# Patient Record
Sex: FEMALE | Race: WHITE | NOT HISPANIC OR LATINO | Employment: OTHER | URBAN - METROPOLITAN AREA
[De-identification: names, ages, dates, MRNs, and addresses within clinical notes are randomized per-mention and may not be internally consistent; named-entity substitution may affect disease eponyms.]

---

## 2017-10-31 ENCOUNTER — HOSPITAL ENCOUNTER (OUTPATIENT)
Dept: RADIOLOGY | Facility: HOSPITAL | Age: 62
Discharge: HOME | End: 2017-10-31
Attending: INTERNAL MEDICINE
Payer: MEDICARE

## 2017-10-31 ENCOUNTER — OFFICE VISIT (OUTPATIENT)
Dept: RHEUMATOLOGY | Facility: CLINIC | Age: 62
End: 2017-10-31
Payer: MEDICARE

## 2017-10-31 VITALS
DIASTOLIC BLOOD PRESSURE: 90 MMHG | HEART RATE: 80 BPM | SYSTOLIC BLOOD PRESSURE: 130 MMHG | HEIGHT: 60 IN | WEIGHT: 132.5 LBS | BODY MASS INDEX: 26.01 KG/M2

## 2017-10-31 DIAGNOSIS — M13.0 POLYARTHRITIS: ICD-10-CM

## 2017-10-31 DIAGNOSIS — R76.8 ANA POSITIVE: ICD-10-CM

## 2017-10-31 DIAGNOSIS — R76.8 ANA POSITIVE: Primary | ICD-10-CM

## 2017-10-31 DIAGNOSIS — I73.00 RAYNAUD'S DISEASE WITHOUT GANGRENE: ICD-10-CM

## 2017-10-31 PROCEDURE — 99205 OFFICE O/P NEW HI 60 MIN: CPT | Mod: S$GLB,,, | Performed by: INTERNAL MEDICINE

## 2017-10-31 PROCEDURE — 99999 PR PBB SHADOW E&M-NEW PATIENT-LVL III: CPT | Mod: PBBFAC,,, | Performed by: INTERNAL MEDICINE

## 2017-10-31 PROCEDURE — 73130 X-RAY EXAM OF HAND: CPT | Mod: 26,RT,, | Performed by: RADIOLOGY

## 2017-10-31 PROCEDURE — 73130 X-RAY EXAM OF HAND: CPT | Mod: 50,TC,PO

## 2017-10-31 PROCEDURE — 73130 X-RAY EXAM OF HAND: CPT | Mod: 26,59,LT, | Performed by: RADIOLOGY

## 2017-10-31 RX ORDER — METFORMIN HYDROCHLORIDE 500 MG/1
500 TABLET ORAL
COMMUNITY

## 2017-10-31 RX ORDER — ALBUTEROL SULFATE 90 UG/1
2 AEROSOL, METERED RESPIRATORY (INHALATION) EVERY 6 HOURS PRN
COMMUNITY

## 2017-10-31 RX ORDER — NABUMETONE 500 MG/1
500 TABLET, FILM COATED ORAL 2 TIMES DAILY
Qty: 60 TABLET | Refills: 11 | Status: SHIPPED | OUTPATIENT
Start: 2017-10-31 | End: 2018-03-13 | Stop reason: SDUPTHER

## 2017-10-31 RX ORDER — GABAPENTIN 300 MG/1
300 CAPSULE ORAL 2 TIMES DAILY
COMMUNITY

## 2017-10-31 RX ORDER — MELOXICAM 15 MG/1
15 TABLET ORAL DAILY
COMMUNITY
End: 2017-10-31

## 2017-10-31 RX ORDER — NIFEDIPINE 30 MG/1
30 TABLET, FILM COATED, EXTENDED RELEASE ORAL DAILY
Qty: 30 TABLET | Refills: 11 | Status: SHIPPED | OUTPATIENT
Start: 2017-10-31 | End: 2018-03-13 | Stop reason: SDUPTHER

## 2017-10-31 RX ORDER — FLUTICASONE PROPIONATE AND SALMETEROL 100; 50 UG/1; UG/1
1 POWDER RESPIRATORY (INHALATION) 2 TIMES DAILY
COMMUNITY
End: 2021-05-05

## 2017-10-31 RX ORDER — ROSUVASTATIN CALCIUM 20 MG/1
20 TABLET, COATED ORAL DAILY
COMMUNITY
End: 2021-05-05

## 2017-10-31 RX ORDER — NORTRIPTYLINE HYDROCHLORIDE 25 MG/1
25 CAPSULE ORAL NIGHTLY
COMMUNITY

## 2017-10-31 RX ORDER — OMEPRAZOLE 20 MG/1
20 CAPSULE, DELAYED RELEASE ORAL DAILY
COMMUNITY

## 2017-10-31 NOTE — PROGRESS NOTES
Subjective:          Chief Complaint: Minal Hay is a 62 y.o. female who had concerns including Disease Management.    HPI:    She is a 62-year-old female she's here for history of possible RA with waxing serologies, ++Raynauds triphasic hands and feet. She has seen [facility] rheumatology in the past told she may have RA  Pain in fingers, right elbow, bursitis in hips and shoulders intermittently.   Recent +DARYL 7/2017: 1:80 centromere pattern, RF negative, hepatitis panel.   She has stiffness in joints worse with use. Intermittent swelling throughout the day. She notes some stiffness in the morning.   No Ps, no famhx ps, no IBD. DDD in the cervical spine using gabapentin for this. No FRANKIE/RFA but has not seen in about 1 year.   No anemia, with exception of menstruation. No hx seizure, no stroke. She does have COPD no ILD. Pleurisy only with pneumonia. She has residual scar on right lung. No sclerodactyly  She denies sclerodactyly, no dysphagia. + peripheral neuropathy attributed to the Raynauds-never NCS/EMG-describes pin/needles in feet constantly. Feel like walking on rough concrete when walking.   She uses Mobic 15mg daily some help.     Breast cancer x 10 years. Lumpectomy and radiation. UTD mammogram.       REVIEW OF SYSTEMS:    Review of Systems   Constitutional: Negative for fever, malaise/fatigue and weight loss.   HENT: Negative for sore throat.    Eyes: Negative for double vision, photophobia and redness.   Respiratory: Negative for cough, shortness of breath and wheezing.    Cardiovascular: Negative for chest pain, palpitations and orthopnea.   Gastrointestinal: Negative for abdominal pain, constipation and diarrhea.   Genitourinary: Negative for dysuria, hematuria and urgency.   Musculoskeletal: Positive for joint pain. Negative for back pain and myalgias.   Skin: Negative for rash.   Neurological: Negative for dizziness, tingling, focal weakness and headaches.   Endo/Heme/Allergies: Does not bruise/bleed  easily.   Psychiatric/Behavioral: Negative for depression, hallucinations and suicidal ideas.               Objective:            No past medical history on file.  No family history on file.  Social History   Substance Use Topics    Smoking status: Former Smoker     Packs/day: 1.50     Years: 42.00     Types: Cigarettes     Quit date: 2013    Smokeless tobacco: Never Used    Alcohol use No         No current outpatient prescriptions on file prior to visit.     No current facility-administered medications on file prior to visit.        Vitals:    10/31/17 1100   BP: (!) 130/90   Pulse: 80       Physical Exam:    Physical Exam   Constitutional: She appears well-developed and well-nourished.   HENT:   Nose: No septal deviation.   Mouth/Throat: Mucous membranes are normal. No oral lesions.   Eyes: Pupils are equal, round, and reactive to light. Right conjunctiva is not injected. Left conjunctiva is not injected.   Neck: No JVD present. No thyroid mass and no thyromegaly present.   Cardiovascular: Normal rate, regular rhythm and normal pulses.    No edema   Pulmonary/Chest: Effort normal and breath sounds normal.   Abdominal: Soft. Normal appearance. There is no hepatosplenomegaly.   Musculoskeletal:        Right shoulder: She exhibits tenderness. She exhibits normal range of motion and no swelling.        Left shoulder: She exhibits tenderness. She exhibits normal range of motion and no swelling.        Right elbow: She exhibits normal range of motion and no swelling. No tenderness found.        Left elbow: She exhibits normal range of motion and no swelling. No tenderness found.        Right wrist: She exhibits normal range of motion, no tenderness and no swelling.        Left wrist: She exhibits normal range of motion, no tenderness and no swelling.        Right hip: She exhibits bony tenderness. She exhibits normal range of motion, normal strength and no swelling.        Left hip: She exhibits bony tenderness. She  exhibits normal range of motion, no tenderness and no swelling.        Right knee: She exhibits normal range of motion and no swelling. No tenderness found.        Left knee: She exhibits normal range of motion and no swelling. No tenderness found.        Right ankle: She exhibits normal range of motion and no swelling. No tenderness.        Left ankle: She exhibits normal range of motion and no swelling. No tenderness.        Right hand: She exhibits tenderness.        Left hand: She exhibits tenderness.   5/5 upper and lower extremity bilateral muscle strength   Lymphadenopathy:     She has no cervical adenopathy.     She has no axillary adenopathy.   Neurological: She has normal strength and normal reflexes.   Skin: Skin is dry and intact.   +Raynauds   Psychiatric: She has a normal mood and affect.             Assessment:       Encounter Diagnoses   Name Primary?    DARYL positive Yes    Raynaud's disease without gangrene     Polyarthritis           Plan:        DARYL positive  Comments:  UCTD vs incidental +DARYL   Orders:  -     Rheumatoid factor; Future; Expected date: 10/31/2017  -     Sedimentation rate, manual; Future; Expected date: 10/31/2017  -     Sjogrens syndrome-A extractable nuclear antibody; Future; Expected date: 10/31/2017  -     Anti Sm/RNP Antibody; Future; Expected date: 10/31/2017  -     Anti-DNA antibody, double-stranded; Future; Expected date: 10/31/2017  -     Anti-Smith antibody; Future; Expected date: 10/31/2017  -     C3 complement; Future; Expected date: 10/31/2017  -     C4 complement; Future; Expected date: 10/31/2017  -     Complement, total; Future; Expected date: 10/31/2017  -     Sjogrens syndrome-B extractable nuclear antibody; Future; Expected date: 10/31/2017  -     Rheumatoid factor; Future; Expected date: 10/31/2017  -     Cyclic citrul peptide antibody, IgG; Future; Expected date: 10/31/2017  -     NIFEdipine (ADALAT CC) 30 MG TbSR; Take 1 tablet (30 mg total) by mouth once  daily.  Dispense: 30 tablet; Refill: 11  -     X-Ray Hand 3 View Bilateral; Future; Expected date: 10/31/2017    Raynaud's disease without gangrene  -     nabumetone (RELAFEN) 500 MG tablet; Take 1 tablet (500 mg total) by mouth 2 (two) times daily.  Dispense: 60 tablet; Refill: 11    Polyarthritis  -     X-Ray Hand 3 View Bilateral; Future; Expected date: 10/31/2017      Pt with +DARYL and raynauds : arthritis more OA in pattern but for now consider UCTD.    -start nifedipine  Regarding arthritis   -change meloxicam to nabumetone see if this helps   -baseline radiographs.   No indication of major organ dysfunction. The centromere pattern would make one think CREST but no features other than Raynaud's  Some sicca complaints: check SSA /SSB.       Return in about 4 months (around 2/28/2018).        Thank you for allowing me to participate in the care of this very pleasant patient.    60min consultation with greater than 50% spent in counseling, chart review and coordination of care. All questions answered.

## 2017-10-31 NOTE — LETTER
November 5, 2017      Anthony D. Haase III, MD  [address] [place] [address]           [facility] [address] [facility] [address]  [place] [address]  Phone: [phone]  Fax: [phone]          Patient: Minal Hay   MR Number: [id number]   YOB: 1955   Date of Visit: 10/31/2017       Dear Dr. Anthony D. Haase III:    Thank you for referring Minal Hay to me for evaluation. Attached you will find relevant portions of my assessment and plan of care.    If you have questions, please do not hesitate to call me. I look forward to following Minal Hay along with you.    Sincerely,    Karina Mcghee, DO    Enclosure  CC:  No Recipients    If you would like to receive this communication electronically, please contact [email or web address] or [phone] to request more information on [facility] access.    For providers and/or their staff who would like to refer a patient to [facility], please contact us through our one-stop-shop provider referral line, [facility], at [phone].    If you feel you have received this communication in error or would no longer like to receive these types of communications, please e-mail [email or web address]

## 2018-03-13 ENCOUNTER — OFFICE VISIT (OUTPATIENT)
Dept: RHEUMATOLOGY | Facility: CLINIC | Age: 63
End: 2018-03-13
Payer: MEDICARE

## 2018-03-13 VITALS
HEIGHT: 60 IN | WEIGHT: 132.38 LBS | DIASTOLIC BLOOD PRESSURE: 70 MMHG | BODY MASS INDEX: 25.99 KG/M2 | HEART RATE: 64 BPM | SYSTOLIC BLOOD PRESSURE: 100 MMHG

## 2018-03-13 DIAGNOSIS — M15.9 PRIMARY OSTEOARTHRITIS INVOLVING MULTIPLE JOINTS: Primary | ICD-10-CM

## 2018-03-13 DIAGNOSIS — M50.30 DDD (DEGENERATIVE DISC DISEASE), CERVICAL: ICD-10-CM

## 2018-03-13 DIAGNOSIS — M75.52 ACUTE BURSITIS OF LEFT SHOULDER: ICD-10-CM

## 2018-03-13 DIAGNOSIS — M77.11 LATERAL EPICONDYLITIS OF RIGHT ELBOW: ICD-10-CM

## 2018-03-13 DIAGNOSIS — I73.00 RAYNAUD'S DISEASE WITHOUT GANGRENE: ICD-10-CM

## 2018-03-13 DIAGNOSIS — R76.8 ANA POSITIVE: ICD-10-CM

## 2018-03-13 DIAGNOSIS — M70.62 GREATER TROCHANTERIC BURSITIS OF LEFT HIP: ICD-10-CM

## 2018-03-13 PROCEDURE — 20610 DRAIN/INJ JOINT/BURSA W/O US: CPT | Mod: LT,S$GLB,, | Performed by: INTERNAL MEDICINE

## 2018-03-13 PROCEDURE — 99999 PR PBB SHADOW E&M-EST. PATIENT-LVL III: CPT | Mod: PBBFAC,,, | Performed by: INTERNAL MEDICINE

## 2018-03-13 PROCEDURE — 99214 OFFICE O/P EST MOD 30 MIN: CPT | Mod: 25,S$GLB,, | Performed by: INTERNAL MEDICINE

## 2018-03-13 RX ORDER — NIFEDIPINE 30 MG/1
30 TABLET, FILM COATED, EXTENDED RELEASE ORAL DAILY
Qty: 30 TABLET | Refills: 11 | Status: SHIPPED | OUTPATIENT
Start: 2018-03-13 | End: 2018-11-28 | Stop reason: SDUPTHER

## 2018-03-13 RX ORDER — NABUMETONE 500 MG/1
500 TABLET, FILM COATED ORAL 2 TIMES DAILY
Qty: 60 TABLET | Refills: 11 | Status: SHIPPED | OUTPATIENT
Start: 2018-03-13 | End: 2018-07-24

## 2018-03-13 RX ADMIN — METHYLPREDNISOLONE ACETATE 40 MG: 40 INJECTION, SUSPENSION INTRA-ARTICULAR; INTRALESIONAL; INTRAMUSCULAR; SOFT TISSUE at 05:03

## 2018-03-13 ASSESSMENT — ROUTINE ASSESSMENT OF PATIENT INDEX DATA (RAPID3)
PSYCHOLOGICAL DISTRESS SCORE: 1.1
PAIN SCORE: 6
TOTAL RAPID3 SCORE: 3.17
MDHAQ FUNCTION SCORE: .3
PATIENT GLOBAL ASSESSMENT SCORE: 2.5

## 2018-03-13 NOTE — PROCEDURES
Large Joint Aspiration/Injection  Date/Time: 3/13/2018 5:01 PM  Performed by: LAURA KIMBROUGH  Authorized by: LAURA KIMBROUGH     Consent Done?:  Yes (Verbal)  Indications:  Pain  Procedure site marked: Yes    Timeout: Prior to procedure the correct patient, procedure, and site was verified      Location:  Hip  Site:  L greater trochanteric bursa  Ultrasonic Guidance for needle placement: No  Needle size:  25 G  Approach:  Lateral  Medications:  40 mg methylPREDNISolone acetate 40 mg/mL  Patient tolerance:  Patient tolerated the procedure well with no immediate complications    Additional Comments: Patient informed of the risks, benefits, side effects of corticosteroid injections including but not limited to skin hypopigmentation, atrophy, ineffectiveness and infection.

## 2018-03-13 NOTE — PROGRESS NOTES
Subjective:          Chief Complaint: Minal Hay is a 62 y.o. female who had concerns including Disease Management.    HPI:  Interval events:    started nifedipine for Raynaud's working very well given winter we had is great.   serologies as below negative  She's having   right elbow with tendinitis, worse in AM after sleeping on it.    left shoulder pain with some cervical spasm she did well with PT as well as FRANKIE/RFA.    left hip pain with bursitis -has pain with sitting, crossing legs, lying on left at night       fingers or aching we switched her from previous nonsteroidal to Relafen. She is doing very well.       She is a 62-year-old female she's here for history of possible RA with waxing serologies, ++Raynauds triphasic hands and feet. She has seen [facility] rheumatology in the past told she may have RA  Pain in fingers, right elbow, bursitis in hips and shoulders intermittently.   Recent +DARYL 7/2017: 1:80 centromere pattern, RF negative, hepatitis panel.   She has stiffness in joints worse with use. Intermittent swelling throughout the day. She notes some stiffness in the morning.   No Ps, no famhx ps, no IBD. DDD in the cervical spine using gabapentin for this. No FRANKIE/RFA but has not seen in about 1 year.   No anemia, with exception of menstruation. No hx seizure, no stroke. She does have COPD no ILD. Pleurisy only with pneumonia. She has residual scar on right lung. No sclerodactyly  She denies sclerodactyly, no dysphagia. + peripheral neuropathy attributed to the Raynauds-never NCS/EMG-describes pin/needles in feet constantly. Feel like walking on rough concrete when walking.   She uses Mobic 15mg daily some help.     Breast cancer x 10 years. Lumpectomy and radiation. UTD mammogram.   Component      Latest Ref Rng & Units 10/31/2017 10/31/2017          12:21 PM 12:21 PM   Anti-SSA Antibody      0.00 - 19.99 EU  2.46   Anti-SSA Interpretation      Negative  Negative   Anti Sm/RNP Antibody      0.00 -  19.99 EU  1.63   Anti-Sm/RNP Interpretation      Negative  Negative   Anti Sm Antibody      0.00 - 19.99 EU  2.01   Anti-Sm Interpretation      Negative  Negative   Anti-SSB Antibody      0.00 - 19.99 EU  0.28   Anti-SSB Interpretation      Negative  Negative   Rheumatoid Factor      0.0 - 15.0 IU/mL <10.0 <10.0   Sed Rate      0 - 20 mm/Hr  18   ds DNA Ab      Negative 1:10  Negative 1:10   Complement (C-3)      50 - 180 mg/dL  139   Complement (C-4)      11 - 44 mg/dL  25   Complement,Total, Serum      42 - 95 U/mL  82   CCP Antibodies      <5.0 U/mL  <0.5       REVIEW OF SYSTEMS:    Review of Systems   Constitutional: Negative for fever, malaise/fatigue and weight loss.   HENT: Negative for sore throat.    Eyes: Negative for double vision, photophobia and redness.   Respiratory: Negative for cough, shortness of breath and wheezing.    Cardiovascular: Negative for chest pain, palpitations and orthopnea.   Gastrointestinal: Negative for abdominal pain, constipation and diarrhea.   Genitourinary: Negative for dysuria, hematuria and urgency.   Musculoskeletal: Positive for joint pain. Negative for back pain and myalgias.   Skin: Negative for rash.   Neurological: Negative for dizziness, tingling, focal weakness and headaches.   Endo/Heme/Allergies: Does not bruise/bleed easily.   Psychiatric/Behavioral: Negative for depression, hallucinations and suicidal ideas.               Objective:            Past Medical History:   Diagnosis Date    Arthritis     Cancer     COPD (chronic obstructive pulmonary disease)     Dry eyes     GERD (gastroesophageal reflux disease)     Hyperlipidemia      Family History   Problem Relation Age of Onset    Alzheimer's disease Mother     Hypertension Mother     Cancer Father     Hypertension Sister     Kidney disease Sister     Heart disease Sister     Hypertension Brother     No Known Problems Daughter     Guillain-Tiger syndrome Maternal Aunt     Cancer Paternal  Grandmother     Cancer Paternal Grandfather     COPD Paternal Grandfather     Fibromyalgia Sister     Diabetes Mellitus Sister     No Known Problems Sister     No Known Problems Sister     Diabetes Mellitus Brother     Hypertension Brother     Hyperlipidemia Brother     Diabetes Mellitus Brother     Hypertension Brother     Osteoarthritis Brother      Social History   Substance Use Topics    Smoking status: Former Smoker     Packs/day: 1.50     Years: 42.00     Types: Cigarettes     Quit date: 2013    Smokeless tobacco: Never Used    Alcohol use No         Current Outpatient Prescriptions on File Prior to Visit   Medication Sig Dispense Refill    albuterol 90 mcg/actuation inhaler Inhale 2 puffs into the lungs every 6 (six) hours as needed for Wheezing. Rescue      fluticasone-salmeterol 100-50 mcg/dose (ADVAIR DISKUS) 100-50 mcg/dose diskus inhaler Inhale 1 puff into the lungs 2 (two) times daily. Controller      gabapentin (NEURONTIN) 300 MG capsule Take 300 mg by mouth 2 (two) times daily.      metFORMIN (GLUCOPHAGE) 500 MG tablet Take 500 mg by mouth daily with breakfast.      nabumetone (RELAFEN) 500 MG tablet Take 1 tablet (500 mg total) by mouth 2 (two) times daily. 60 tablet 11    NIFEdipine (ADALAT CC) 30 MG TbSR Take 1 tablet (30 mg total) by mouth once daily. 30 tablet 11    nortriptyline (PAMELOR) 25 MG capsule Take 25 mg by mouth every evening.      omeprazole (PRILOSEC) 20 MG capsule Take 20 mg by mouth once daily.      rosuvastatin (CRESTOR) 20 MG tablet Take 20 mg by mouth once daily.       No current facility-administered medications on file prior to visit.        Vitals:    03/13/18 1614   BP: 100/70   Pulse: 64       Physical Exam:    Physical Exam   Constitutional: She appears well-developed and well-nourished.   HENT:   Nose: No septal deviation.   Mouth/Throat: Mucous membranes are normal. No oral lesions.   Eyes: Pupils are equal, round, and reactive to light. Right  conjunctiva is not injected. Left conjunctiva is not injected.   Neck: No JVD present. No thyroid mass and no thyromegaly present.   Cardiovascular: Normal rate, regular rhythm and normal pulses.    No edema   Pulmonary/Chest: Effort normal and breath sounds normal.   Abdominal: Soft. Normal appearance. There is no hepatosplenomegaly.   Musculoskeletal:        Right shoulder: She exhibits tenderness. She exhibits normal range of motion and no swelling.        Left shoulder: She exhibits tenderness. She exhibits normal range of motion and no swelling.        Right elbow: She exhibits normal range of motion and no swelling. No tenderness found.        Left elbow: She exhibits normal range of motion and no swelling. No tenderness found.        Right wrist: She exhibits normal range of motion, no tenderness and no swelling.        Left wrist: She exhibits normal range of motion, no tenderness and no swelling.        Right hip: She exhibits bony tenderness. She exhibits normal range of motion, normal strength and no swelling.        Left hip: She exhibits bony tenderness. She exhibits normal range of motion, no tenderness and no swelling.        Right knee: She exhibits normal range of motion and no swelling. No tenderness found.        Left knee: She exhibits normal range of motion and no swelling. No tenderness found.        Right ankle: She exhibits normal range of motion and no swelling. No tenderness.        Left ankle: She exhibits normal range of motion and no swelling. No tenderness.        Right hand: She exhibits tenderness.        Left hand: She exhibits tenderness.   5/5 upper and lower extremity bilateral muscle strength   Lymphadenopathy:     She has no cervical adenopathy.     She has no axillary adenopathy.   Neurological: She has normal strength and normal reflexes.   Skin: Skin is dry and intact.   +Raynauds   Psychiatric: She has a normal mood and affect.             Assessment:       Encounter  Diagnoses   Name Primary?    Primary osteoarthritis involving multiple joints Yes    Lateral epicondylitis of right elbow     Greater trochanteric bursitis of left hip     Acute bursitis of left shoulder     DDD (degenerative disc disease), cervical     DARYL positive     Raynaud's disease without gangrene           Plan:        Primary osteoarthritis involving multiple joints  -     nabumetone (RELAFEN) 500 MG tablet; Take 1 tablet (500 mg total) by mouth 2 (two) times daily.  Dispense: 60 tablet; Refill: 11    Lateral epicondylitis of right elbow  Comments:  exercises given  Orders:  -     nabumetone (RELAFEN) 500 MG tablet; Take 1 tablet (500 mg total) by mouth 2 (two) times daily.  Dispense: 60 tablet; Refill: 11    Greater trochanteric bursitis of left hip  Comments:  inject today  Orders:  -     Large Joint Aspiration/Injection    Acute bursitis of left shoulder  Comments:  Brian    DDD (degenerative disc disease), cervical  [facility]    DARYL positive  Comments:  UCTD vs incidental +DARYL     Raynaud's disease without gangrene  -     NIFEdipine (ADALAT CC) 30 MG TbSR; Take 1 tablet (30 mg total) by mouth once daily.  Dispense: 30 tablet; Refill: 11  -     nabumetone (RELAFEN) 500 MG tablet; Take 1 tablet (500 mg total) by mouth 2 (two) times daily.  Dispense: 60 tablet; Refill: 11      Pt with +DARYL and raynauds : arthritis more OA in pattern    -continue nifedipine  Regarding arthritis   -Nabumetone doing well  Injection greater trochanter LEFT    No indication of major organ dysfunction. The centromere pattern would make one think CREST but no features other than Raynaud's    Follow-up in about 5 months (around 8/13/2018).        30min consultation with greater than 50% spent in counseling, chart review and coordination of care. All questions answered.

## 2018-03-25 RX ORDER — METHYLPREDNISOLONE ACETATE 40 MG/ML
40 INJECTION, SUSPENSION INTRA-ARTICULAR; INTRALESIONAL; INTRAMUSCULAR; SOFT TISSUE
Status: DISCONTINUED | OUTPATIENT
Start: 2018-03-13 | End: 2018-03-25 | Stop reason: HOSPADM

## 2018-07-24 ENCOUNTER — OFFICE VISIT (OUTPATIENT)
Dept: RHEUMATOLOGY | Facility: CLINIC | Age: 63
End: 2018-07-24
Payer: MEDICARE

## 2018-07-24 VITALS
SYSTOLIC BLOOD PRESSURE: 100 MMHG | HEIGHT: 60 IN | BODY MASS INDEX: 25.67 KG/M2 | DIASTOLIC BLOOD PRESSURE: 70 MMHG | WEIGHT: 130.75 LBS | HEART RATE: 64 BPM

## 2018-07-24 DIAGNOSIS — M15.9 PRIMARY OSTEOARTHRITIS INVOLVING MULTIPLE JOINTS: ICD-10-CM

## 2018-07-24 DIAGNOSIS — M75.52 ACUTE SHOULDER BURSITIS, LEFT: Primary | ICD-10-CM

## 2018-07-24 DIAGNOSIS — M77.01 GOLFER'S ELBOW, RIGHT: ICD-10-CM

## 2018-07-24 DIAGNOSIS — G56.21 ENTRAPMENT OF RIGHT ULNAR NERVE: ICD-10-CM

## 2018-07-24 PROCEDURE — 99214 OFFICE O/P EST MOD 30 MIN: CPT | Mod: 25,S$GLB,, | Performed by: INTERNAL MEDICINE

## 2018-07-24 PROCEDURE — 3008F BODY MASS INDEX DOCD: CPT | Mod: CPTII,S$GLB,, | Performed by: INTERNAL MEDICINE

## 2018-07-24 PROCEDURE — 99999 PR PBB SHADOW E&M-EST. PATIENT-LVL III: CPT | Mod: PBBFAC,,, | Performed by: INTERNAL MEDICINE

## 2018-07-24 RX ORDER — DICLOFENAC SODIUM 50 MG/1
50 TABLET, DELAYED RELEASE ORAL 2 TIMES DAILY
Qty: 60 TABLET | Refills: 4 | Status: SHIPPED | OUTPATIENT
Start: 2018-07-24 | End: 2018-08-23

## 2018-07-24 ASSESSMENT — ROUTINE ASSESSMENT OF PATIENT INDEX DATA (RAPID3)
MDHAQ FUNCTION SCORE: .3
PAIN SCORE: 10
PATIENT GLOBAL ASSESSMENT SCORE: 4
TOTAL RAPID3 SCORE: 5
PSYCHOLOGICAL DISTRESS SCORE: 0

## 2018-07-24 NOTE — PROGRESS NOTES
Subjective:          Chief Complaint: Minal Hay is a 62 y.o. female who had concerns including Disease Management.    HPI:  Interval events:   nifedipine for Raynaud's working very well    serologies as below negative  Injected gr. Troch last visit  Continued Nabumetone.      right elbow with tendinitis, progressively worsening. She notes exacerbated with typing on computer. Not much pain in the right shoulder. Elbow increased in intensity last 30 days. Worse with elbow flexed. Some numbness. Waking with hand asleep.    left shoulder pain with some cervical spasm she did well with PT as well as FRANKIE/RFA. Left shoulder increasingly painful with driving. She has pain with putting on shirts. On the left.    left hip pain with bursitis doing well with this since last visit.   She states there are days where she just hurts from neck to toes.   Hands still painful.     RHeum Hx:     She is a 62-year-old female she's here for history of possible RA with waxing serologies, ++Raynauds triphasic hands and feet. She has seen [facility] rheumatology in the past told she may have RA  Pain in fingers, right elbow, bursitis in hips and shoulders intermittently.   Recent +DARYL 7/2017: 1:80 centromere pattern, RF negative, hepatitis panel.   She has stiffness in joints worse with use. Intermittent swelling throughout the day. She notes some stiffness in the morning.   No Ps, no famhx ps, no IBD. DDD in the cervical spine using gabapentin for this. No FRANKIE/RFA but has not seen in about 1 year.   No anemia, with exception of menstruation. No hx seizure, no stroke. She does have COPD no ILD. Pleurisy only with pneumonia. She has residual scar on right lung. No sclerodactyly  She denies sclerodactyly, no dysphagia. + peripheral neuropathy attributed to the Raynauds-never NCS/EMG-describes pin/needles in feet constantly. Feel like walking on rough concrete when walking.   She uses Mobic 15mg daily some help.     Breast cancer x 10 years.  Lumpectomy and radiation. UTD mammogram.   Component      Latest Ref Rng & Units 10/31/2017 10/31/2017          12:21 PM 12:21 PM   Anti-SSA Antibody      0.00 - 19.99 EU  2.46   Anti-SSA Interpretation      Negative  Negative   Anti Sm/RNP Antibody      0.00 - 19.99 EU  1.63   Anti-Sm/RNP Interpretation      Negative  Negative   Anti Sm Antibody      0.00 - 19.99 EU  2.01   Anti-Sm Interpretation      Negative  Negative   Anti-SSB Antibody      0.00 - 19.99 EU  0.28   Anti-SSB Interpretation      Negative  Negative   Rheumatoid Factor      0.0 - 15.0 IU/mL <10.0 <10.0   Sed Rate      0 - 20 mm/Hr  18   ds DNA Ab      Negative 1:10  Negative 1:10   Complement (C-3)      50 - 180 mg/dL  139   Complement (C-4)      11 - 44 mg/dL  25   Complement,Total, Serum      42 - 95 U/mL  82   CCP Antibodies      <5.0 U/mL  <0.5       REVIEW OF SYSTEMS:    Review of Systems   Constitutional: Negative for fever, malaise/fatigue and weight loss.   HENT: Negative for sore throat.    Eyes: Negative for double vision, photophobia and redness.   Respiratory: Negative for cough, shortness of breath and wheezing.    Cardiovascular: Negative for chest pain, palpitations and orthopnea.   Gastrointestinal: Negative for abdominal pain, constipation and diarrhea.   Genitourinary: Negative for dysuria, hematuria and urgency.   Musculoskeletal: Positive for joint pain. Negative for back pain and myalgias.   Skin: Negative for rash.   Neurological: Negative for dizziness, tingling, focal weakness and headaches.   Endo/Heme/Allergies: Does not bruise/bleed easily.   Psychiatric/Behavioral: Negative for depression, hallucinations and suicidal ideas.               Objective:            Past Medical History:   Diagnosis Date    Arthritis     Cancer     COPD (chronic obstructive pulmonary disease)     Dry eyes     GERD (gastroesophageal reflux disease)     Hyperlipidemia      Family History   Problem Relation Age of Onset    Alzheimer's  disease Mother     Hypertension Mother     Cancer Father     Hypertension Sister     Kidney disease Sister     Heart disease Sister     Hypertension Brother     No Known Problems Daughter     Guillain-[place] syndrome Maternal Aunt     Cancer Paternal Grandmother     Cancer Paternal Grandfather     COPD Paternal Grandfather     Fibromyalgia Sister     Diabetes Mellitus Sister     No Known Problems Sister     No Known Problems Sister     Diabetes Mellitus Brother     Hypertension Brother     Hyperlipidemia Brother     Diabetes Mellitus Brother     Hypertension Brother     Osteoarthritis Brother      Social History   Substance Use Topics    Smoking status: Former Smoker     Packs/day: 1.50     Years: 42.00     Types: Cigarettes     Quit date: 2013    Smokeless tobacco: Never Used    Alcohol use No         Current Outpatient Prescriptions on File Prior to Visit   Medication Sig Dispense Refill    albuterol 90 mcg/actuation inhaler Inhale 2 puffs into the lungs every 6 (six) hours as needed for Wheezing. Rescue      fluticasone-salmeterol 100-50 mcg/dose (ADVAIR DISKUS) 100-50 mcg/dose diskus inhaler Inhale 1 puff into the lungs 2 (two) times daily. Controller      gabapentin (NEURONTIN) 300 MG capsule Take 300 mg by mouth 2 (two) times daily.      metFORMIN (GLUCOPHAGE) 500 MG tablet Take 500 mg by mouth daily with breakfast.      nabumetone (RELAFEN) 500 MG tablet Take 1 tablet (500 mg total) by mouth 2 (two) times daily. 60 tablet 11    NIFEdipine (ADALAT CC) 30 MG TbSR Take 1 tablet (30 mg total) by mouth once daily. 30 tablet 11    nortriptyline (PAMELOR) 25 MG capsule Take 25 mg by mouth every evening.      omeprazole (PRILOSEC) 20 MG capsule Take 20 mg by mouth once daily.      rosuvastatin (CRESTOR) 20 MG tablet Take 20 mg by mouth once daily.       No current facility-administered medications on file prior to visit.        There were no vitals filed for this visit.    Physical  Exam:    Physical Exam   Constitutional: She appears well-developed and well-nourished.   HENT:   Nose: No septal deviation.   Mouth/Throat: Mucous membranes are normal. No oral lesions.   Eyes: Pupils are equal, round, and reactive to light. Right conjunctiva is not injected. Left conjunctiva is not injected.   Neck: No JVD present. No thyroid mass and no thyromegaly present.   Cardiovascular: Normal rate, regular rhythm and normal pulses.    No edema   Pulmonary/Chest: Effort normal and breath sounds normal.   Abdominal: Soft. Normal appearance. There is no hepatosplenomegaly.   Musculoskeletal:        Right shoulder: She exhibits tenderness. She exhibits normal range of motion and no swelling.        Left shoulder: She exhibits tenderness. She exhibits normal range of motion and no swelling.        Right elbow: She exhibits normal range of motion and no swelling. No tenderness found.        Left elbow: She exhibits normal range of motion and no swelling. No tenderness found.        Right wrist: She exhibits normal range of motion, no tenderness and no swelling.        Left wrist: She exhibits normal range of motion, no tenderness and no swelling.        Right hip: She exhibits bony tenderness. She exhibits normal range of motion, normal strength and no swelling.        Left hip: She exhibits bony tenderness. She exhibits normal range of motion, no tenderness and no swelling.        Right knee: She exhibits normal range of motion and no swelling. No tenderness found.        Left knee: She exhibits normal range of motion and no swelling. No tenderness found.        Right ankle: She exhibits normal range of motion and no swelling. No tenderness.        Left ankle: She exhibits normal range of motion and no swelling. No tenderness.        Right hand: She exhibits tenderness.        Left hand: She exhibits tenderness.   5/5 upper and lower extremity bilateral muscle strength   Lymphadenopathy:     She has no cervical  adenopathy.     She has no axillary adenopathy.   Neurological: She has normal strength and normal reflexes.   Skin: Skin is dry and intact.   +Raynauds   Psychiatric: She has a normal mood and affect.             Assessment:       Encounter Diagnoses   Name Primary?    Acute shoulder bursitis, left Yes    Entrapment of right ulnar nerve     Golfer's elbow, right     Primary osteoarthritis involving multiple joints           Plan:        Acute shoulder bursitis, left  -     diclofenac (VOLTAREN) 50 MG EC tablet; Take 1 tablet (50 mg total) by mouth 2 (two) times daily.  Dispense: 60 tablet; Refill: 4    Entrapment of right ulnar nerve  -     Ambulatory consult to Physical Therapy    Golfer's elbow, right  -     Ambulatory consult to Physical Therapy    Primary osteoarthritis involving multiple joints  -     diclofenac (VOLTAREN) 50 MG EC tablet; Take 1 tablet (50 mg total) by mouth 2 (two) times daily.  Dispense: 60 tablet; Refill: 4      Pt with +DARYL and raynauds : arthritis more OA in pattern    -continue nifedipine  Regarding arthritis   -change to diclofenac  Injected left shoulder today  Referral for PT for suspected Cubital tunnel.      No indication of major organ dysfunction. The centromere pattern would make one think CREST but no features other than Raynaud's    Follow-up in about 4 months (around 11/24/2018).        30min consultation with greater than 50% spent in counseling, chart review and coordination of care. All questions answered.

## 2018-11-28 ENCOUNTER — OFFICE VISIT (OUTPATIENT)
Dept: RHEUMATOLOGY | Facility: CLINIC | Age: 63
End: 2018-11-28
Payer: MEDICARE

## 2018-11-28 ENCOUNTER — HOSPITAL ENCOUNTER (OUTPATIENT)
Dept: RADIOLOGY | Facility: HOSPITAL | Age: 63
Discharge: HOME | End: 2018-11-28
Attending: INTERNAL MEDICINE
Payer: MEDICARE

## 2018-11-28 VITALS
SYSTOLIC BLOOD PRESSURE: 121 MMHG | HEIGHT: 60 IN | BODY MASS INDEX: 26.41 KG/M2 | HEART RATE: 100 BPM | DIASTOLIC BLOOD PRESSURE: 74 MMHG | WEIGHT: 134.5 LBS

## 2018-11-28 DIAGNOSIS — F41.9 ANXIETY: ICD-10-CM

## 2018-11-28 DIAGNOSIS — I73.00 RAYNAUD'S PHENOMENON WITHOUT GANGRENE: Primary | ICD-10-CM

## 2018-11-28 DIAGNOSIS — M50.30 DDD (DEGENERATIVE DISC DISEASE), CERVICAL: ICD-10-CM

## 2018-11-28 DIAGNOSIS — I73.00 RAYNAUD'S DISEASE WITHOUT GANGRENE: ICD-10-CM

## 2018-11-28 PROCEDURE — 72052 X-RAY EXAM NECK SPINE 6/>VWS: CPT | Mod: 26,,, | Performed by: RADIOLOGY

## 2018-11-28 PROCEDURE — 99214 OFFICE O/P EST MOD 30 MIN: CPT | Mod: S$GLB,,, | Performed by: INTERNAL MEDICINE

## 2018-11-28 PROCEDURE — 99999 PR PBB SHADOW E&M-EST. PATIENT-LVL III: CPT | Mod: PBBFAC,,, | Performed by: INTERNAL MEDICINE

## 2018-11-28 PROCEDURE — 72052 X-RAY EXAM NECK SPINE 6/>VWS: CPT | Mod: TC,FY,PO

## 2018-11-28 PROCEDURE — 3008F BODY MASS INDEX DOCD: CPT | Mod: CPTII,S$GLB,, | Performed by: INTERNAL MEDICINE

## 2018-11-28 RX ORDER — NIFEDIPINE 30 MG/1
30 TABLET, FILM COATED, EXTENDED RELEASE ORAL DAILY
Qty: 30 TABLET | Refills: 11 | Status: SHIPPED | OUTPATIENT
Start: 2018-11-28 | End: 2020-02-13 | Stop reason: SDUPTHER

## 2018-11-28 RX ORDER — DICLOFENAC SODIUM 50 MG/1
TABLET, DELAYED RELEASE ORAL
COMMUNITY
Start: 2018-11-05 | End: 2019-11-04 | Stop reason: SDUPTHER

## 2018-11-28 RX ORDER — LORAZEPAM 0.5 MG/1
TABLET ORAL
Qty: 2 TABLET | Refills: 0 | Status: SHIPPED | OUTPATIENT
Start: 2018-11-28 | End: 2021-05-05

## 2018-11-28 NOTE — PROGRESS NOTES
Subjective:          Chief Complaint: Minal Hay is a 63 y.o. female who had concerns including Osteoarthritis (4 month).    HPI:  Interval events:   PT for cubital tunnel: right arm with good results, still some ? Of cervicalsource per PT, but she still notes elbow held up causes hands to be numb. She is haivng spasm in the neck recurrent. Known DDD of the cervical spine with no prior radicular signs now with new radiation in the RUE. No improvement with PT>   Injected left shoulder  nifedipine for Raynaud's working very well    serologies as below negative  Injected gr. Troch last visit  Continued Nabumetone.       RHeum Hx:     She is a 62-year-old female she's here for history of possible RA with waxing serologies, ++Raynauds triphasic hands and feet. She has seen [facility] rheumatology in the past told she may have RA  Pain in fingers, right elbow, bursitis in hips and shoulders intermittently.   Recent +DARYL 7/2017: 1:80 centromere pattern, RF negative, hepatitis panel.   She has stiffness in joints worse with use. Intermittent swelling throughout the day. She notes some stiffness in the morning.   No Ps, no famhx ps, no IBD. DDD in the cervical spine using gabapentin for this. No FRANKIE/RFA but has not seen in about 2 years. .   No anemia, with exception of menstruation. No hx seizure, no stroke. She does have COPD no ILD. Pleurisy only with pneumonia. She has residual scar on right lung. No sclerodactyly  She denies sclerodactyly, no dysphagia. + peripheral neuropathy attributed to the Raynauds-never NCS/EMG-describes pin/needles in feet constantly. Feel like walking on rough concrete when walking.   She uses Mobic 15mg daily some help.     Breast cancer x 10 years. Lumpectomy and radiation. UTD mammogram.   Component      Latest Ref Rng & Units 10/31/2017 10/31/2017          12:21 PM 12:21 PM   Anti-SSA Antibody      0.00 - 19.99 EU  2.46   Anti-SSA Interpretation      Negative  Negative   Anti Sm/RNP  Antibody      0.00 - 19.99 EU  1.63   Anti-Sm/RNP Interpretation      Negative  Negative   Anti Sm Antibody      0.00 - 19.99 EU  2.01   Anti-Sm Interpretation      Negative  Negative   Anti-SSB Antibody      0.00 - 19.99 EU  0.28   Anti-SSB Interpretation      Negative  Negative   Rheumatoid Factor      0.0 - 15.0 IU/mL <10.0 <10.0   Sed Rate      0 - 20 mm/Hr  18   ds DNA Ab      Negative 1:10  Negative 1:10   Complement (C-3)      50 - 180 mg/dL  139   Complement (C-4)      11 - 44 mg/dL  25   Complement,Total, Serum      42 - 95 U/mL  82   CCP Antibodies      <5.0 U/mL  <0.5       REVIEW OF SYSTEMS:    Review of Systems   Constitutional: Negative for fever, malaise/fatigue and weight loss.   HENT: Negative for sore throat.    Eyes: Negative for double vision, photophobia and redness.   Respiratory: Negative for cough, shortness of breath and wheezing.    Cardiovascular: Negative for chest pain, palpitations and orthopnea.   Gastrointestinal: Negative for abdominal pain, constipation and diarrhea.   Genitourinary: Negative for dysuria, hematuria and urgency.   Musculoskeletal: Positive for joint pain. Negative for back pain and myalgias.   Skin: Negative for rash.   Neurological: Negative for dizziness, tingling, focal weakness and headaches.   Endo/Heme/Allergies: Does not bruise/bleed easily.   Psychiatric/Behavioral: Negative for depression, hallucinations and suicidal ideas.               Objective:            Past Medical History:   Diagnosis Date    Arthritis     Cancer     COPD (chronic obstructive pulmonary disease)     Dry eyes     GERD (gastroesophageal reflux disease)     Hyperlipidemia      Family History   Problem Relation Age of Onset    Alzheimer's disease Mother     Hypertension Mother     Cancer Father     Hypertension Sister     Kidney disease Sister     Heart disease Sister     Hypertension Brother     No Known Problems Daughter     Guillain-[place] syndrome Maternal Aunt      Cancer Paternal Grandmother     Cancer Paternal Grandfather     COPD Paternal Grandfather     Fibromyalgia Sister     Diabetes Mellitus Sister     No Known Problems Sister     No Known Problems Sister     Diabetes Mellitus Brother     Hypertension Brother     Hyperlipidemia Brother     Diabetes Mellitus Brother     Hypertension Brother     Osteoarthritis Brother      Social History     Tobacco Use    Smoking status: Former Smoker     Packs/day: 1.50     Years: 42.00     Pack years: 63.00     Types: Cigarettes     Last attempt to quit: 2013     Years since quittin.9    Smokeless tobacco: Never Used   Substance Use Topics    Alcohol use: No    Drug use: No         Current Outpatient Medications on File Prior to Visit   Medication Sig Dispense Refill    albuterol 90 mcg/actuation inhaler Inhale 2 puffs into the lungs every 6 (six) hours as needed for Wheezing. Rescue      diclofenac (VOLTAREN) 50 MG EC tablet       fluticasone-salmeterol 100-50 mcg/dose (ADVAIR DISKUS) 100-50 mcg/dose diskus inhaler Inhale 1 puff into the lungs 2 (two) times daily. Controller      gabapentin (NEURONTIN) 300 MG capsule Take 300 mg by mouth 2 (two) times daily.      metFORMIN (GLUCOPHAGE) 500 MG tablet Take 500 mg by mouth daily with breakfast.      NIFEdipine (ADALAT CC) 30 MG TbSR Take 1 tablet (30 mg total) by mouth once daily. 30 tablet 11    nortriptyline (PAMELOR) 25 MG capsule Take 25 mg by mouth every evening.      omeprazole (PRILOSEC) 20 MG capsule Take 20 mg by mouth once daily.      rosuvastatin (CRESTOR) 20 MG tablet Take 20 mg by mouth once daily.       No current facility-administered medications on file prior to visit.        Vitals:    18 1606   BP: 121/74   Pulse: 100       Physical Exam:    Physical Exam   Constitutional: She appears well-developed and well-nourished.   HENT:   Nose: No septal deviation.   Mouth/Throat: Mucous membranes are normal. No oral lesions.   Eyes: Pupils are  equal, round, and reactive to light. Right conjunctiva is not injected. Left conjunctiva is not injected.   Neck: No JVD present. No thyroid mass and no thyromegaly present.   Cardiovascular: Normal rate, regular rhythm and normal pulses.   No edema   Pulmonary/Chest: Effort normal and breath sounds normal.   Abdominal: Soft. Normal appearance. There is no hepatosplenomegaly.   Musculoskeletal:        Right shoulder: She exhibits tenderness. She exhibits normal range of motion and no swelling.        Left shoulder: She exhibits tenderness. She exhibits normal range of motion and no swelling.        Right elbow: She exhibits normal range of motion and no swelling. No tenderness found.        Left elbow: She exhibits normal range of motion and no swelling. No tenderness found.        Right wrist: She exhibits normal range of motion, no tenderness and no swelling.        Left wrist: She exhibits normal range of motion, no tenderness and no swelling.        Right hip: She exhibits bony tenderness. She exhibits normal range of motion, normal strength and no swelling.        Left hip: She exhibits bony tenderness. She exhibits normal range of motion, no tenderness and no swelling.        Right knee: She exhibits normal range of motion and no swelling. No tenderness found.        Left knee: She exhibits normal range of motion and no swelling. No tenderness found.        Right ankle: She exhibits normal range of motion and no swelling. No tenderness.        Left ankle: She exhibits normal range of motion and no swelling. No tenderness.        Right hand: She exhibits tenderness.        Left hand: She exhibits tenderness.   5/5 upper and lower extremity bilateral muscle strength   Lymphadenopathy:     She has no cervical adenopathy.     She has no axillary adenopathy.   Neurological: She has normal strength and normal reflexes.   Skin: Skin is dry and intact.   +Raynauds   Psychiatric: She has a normal mood and affect.              Assessment:       Encounter Diagnoses   Name Primary?    Raynaud's phenomenon without gangrene Yes    DDD (degenerative disc disease), cervical     Raynaud's disease without gangrene     Anxiety           Plan:        Raynaud's phenomenon without gangrene    DDD (degenerative disc disease), cervical  -     X-Ray Cervical Spine 5 View W Flex Extxt; Future; Expected date: 11/28/2018  -     MRI Cervical Spine Without Contrast; Future; Expected date: 11/28/2018    Raynaud's disease without gangrene  -     NIFEdipine (ADALAT CC) 30 MG TbSR; Take 1 tablet (30 mg total) by mouth once daily.  Dispense: 30 tablet; Refill: 11    Anxiety  -     LORazepam (ATIVAN) 0.5 MG tablet; Take one tablet 30minutes prior to exam, may take another just before exam if needed.  Dispense: 2 tablet; Refill: 0      Pt with +DARYL and raynauds : arthritis more OA in pattern No indication of major organ dysfunction. The centromere pattern would make one think CREST but no features other than Raynaud's   -continue nifedipine  Regarding arthritis   -change to diclofenac  PT no resolution now present 4 +months and progressively worse:  , still some ? Of cervicalsource per PT, but she still notes elbow held up causes hands to be numb. She is haivng spasm in the neck recurrent. Known DDD of the cervical spine with no prior radicular signs now with new radiation in the RUE. No improvement with PT>            Follow-up in about 4 months (around 3/28/2019).        30min consultation with greater than 50% spent in counseling, chart review and coordination of care. All questions answered.

## 2018-12-07 ENCOUNTER — HOSPITAL ENCOUNTER (OUTPATIENT)
Dept: RADIOLOGY | Facility: HOSPITAL | Age: 63
Discharge: HOME | End: 2018-12-07
Attending: INTERNAL MEDICINE
Payer: MEDICARE

## 2018-12-07 DIAGNOSIS — M50.30 DDD (DEGENERATIVE DISC DISEASE), CERVICAL: ICD-10-CM

## 2018-12-07 PROCEDURE — 72141 MRI NECK SPINE W/O DYE: CPT | Mod: TC,PO

## 2018-12-07 PROCEDURE — 72141 MRI NECK SPINE W/O DYE: CPT | Mod: 26,,, | Performed by: RADIOLOGY

## 2018-12-27 ENCOUNTER — PATIENT MESSAGE (OUTPATIENT)
Dept: RHEUMATOLOGY | Facility: CLINIC | Age: 63
End: 2018-12-27

## 2018-12-27 DIAGNOSIS — M48.01 SPINAL STENOSIS OF OCCIPITO-ATLANTO-AXIAL REGION: ICD-10-CM

## 2018-12-27 PROBLEM — M48.00 SPINAL STENOSIS: Status: ACTIVE | Noted: 2018-12-27

## 2018-12-27 NOTE — TELEPHONE ENCOUNTER
Referral [facility] as not sure how much the canal stenosis is playing a role in her s/sx.   APOLINAR

## 2019-01-21 ENCOUNTER — TELEPHONE (OUTPATIENT)
Dept: NEUROSURGERY | Facility: CLINIC | Age: 64
End: 2019-01-21

## 2019-01-21 ENCOUNTER — INITIAL CONSULT (OUTPATIENT)
Dept: NEUROSURGERY | Facility: CLINIC | Age: 64
End: 2019-01-21
Payer: MEDICARE

## 2019-01-21 VITALS
HEIGHT: 60 IN | DIASTOLIC BLOOD PRESSURE: 68 MMHG | WEIGHT: 134.81 LBS | HEART RATE: 93 BPM | SYSTOLIC BLOOD PRESSURE: 107 MMHG | BODY MASS INDEX: 26.47 KG/M2

## 2019-01-21 DIAGNOSIS — M50.30 DEGENERATION OF CERVICAL INTERVERTEBRAL DISC: ICD-10-CM

## 2019-01-21 DIAGNOSIS — M54.12 CERVICAL RADICULOPATHY: ICD-10-CM

## 2019-01-21 DIAGNOSIS — M50.30 DDD (DEGENERATIVE DISC DISEASE), CERVICAL: Primary | ICD-10-CM

## 2019-01-21 PROCEDURE — 99999 PR PBB SHADOW E&M-EST. PATIENT-LVL III: CPT | Mod: PBBFAC,,, | Performed by: NEUROLOGICAL SURGERY

## 2019-01-21 PROCEDURE — 3008F PR BODY MASS INDEX (BMI) DOCUMENTED: ICD-10-PCS | Mod: CPTII,S$GLB,, | Performed by: NEUROLOGICAL SURGERY

## 2019-01-21 PROCEDURE — 3008F BODY MASS INDEX DOCD: CPT | Mod: CPTII,S$GLB,, | Performed by: NEUROLOGICAL SURGERY

## 2019-01-21 PROCEDURE — 99999 PR PBB SHADOW E&M-EST. PATIENT-LVL III: ICD-10-PCS | Mod: PBBFAC,,, | Performed by: NEUROLOGICAL SURGERY

## 2019-01-21 PROCEDURE — 99204 OFFICE O/P NEW MOD 45 MIN: CPT | Mod: S$GLB,,, | Performed by: NEUROLOGICAL SURGERY

## 2019-01-21 PROCEDURE — 99204 PR OFFICE/OUTPT VISIT, NEW, LEVL IV, 45-59 MIN: ICD-10-PCS | Mod: S$GLB,,, | Performed by: NEUROLOGICAL SURGERY

## 2019-01-21 RX ORDER — HYDROCORTISONE 25 MG/G
1 CREAM TOPICAL 2 TIMES DAILY PRN
COMMUNITY
Start: 2018-12-06 | End: 2021-05-05

## 2019-01-21 RX ORDER — ERGOCALCIFEROL 1.25 MG/1
5000 CAPSULE ORAL DAILY
COMMUNITY
End: 2021-11-08

## 2019-01-21 RX ORDER — ROSUVASTATIN CALCIUM 5 MG/1
1 TABLET, COATED ORAL 2 TIMES DAILY PRN
COMMUNITY
Start: 2018-12-03 | End: 2021-05-05

## 2019-01-21 NOTE — LETTER
January 21, 2019      Karina Mcghee, DO  [address] [facility] [address] [place] [address]           [facility]  [address] [facility] [address] [place] [address]  Phone: [phone]  Fax: [phone]          Patient: Minal Hay   MR Number: [id number]   YOB: 1955   Date of Visit: 1/21/2019       Dear Dr. Karina Mcghee:    Thank you for referring Minal Hay to me for evaluation. Attached you will find relevant portions of my assessment and plan of care.    If you have questions, please do not hesitate to call me. I look forward to following Minal Hay along with you.    Sincerely,    Augusto Elizabeth MD    Enclosure  CC:  No Recipients    If you would like to receive this communication electronically, please contact [email or web address] or [phone] to request more information on [facility] access.    For providers and/or their staff who would like to refer a patient to [facility], please contact us through our one-stop-shop provider referral line, [facility], at [phone].    If you feel you have received this communication in error or would no longer like to receive these types of communications, please e-mail [email or web address]

## 2019-01-21 NOTE — PROGRESS NOTES
Neurosurgery History and Physical    Patient ID: Minal Hay is a 63 y.o. female.    Chief Complaint   Patient presents with    Cervical Spine Pain (C-spine)     pt states pain began a while back, not accident related; pt staets doing ROM colt to right and looking up are worse; BUE numbness and tingling; denies bowel and bladder issues; has done PT with success; Neck pain 20% PHQ 3         Review of Systems   HENT: Negative.    Eyes: Negative.    Respiratory: Negative.    Cardiovascular: Negative.    Gastrointestinal: Negative.    Endocrine: Negative.    Genitourinary: Negative.    Musculoskeletal: Positive for arthralgias, back pain and neck pain.   Skin: Positive for pallor.   Allergic/Immunologic: Negative.    Neurological: Positive for weakness and numbness.   Hematological: Negative.    Psychiatric/Behavioral: Negative.        Past Medical History:   Diagnosis Date    Arthritis     Cancer     COPD (chronic obstructive pulmonary disease)     Dry eyes     GERD (gastroesophageal reflux disease)     Hyperlipidemia      Social History     Socioeconomic History    Marital status:      Spouse name: Not on file    Number of children: Not on file    Years of education: Not on file    Highest education level: Not on file   Social Needs    Financial resource strain: Not on file    Food insecurity - worry: Not on file    Food insecurity - inability: Not on file    Transportation needs - medical: Not on file    Transportation needs - non-medical: Not on file   Occupational History    Not on file   Tobacco Use    Smoking status: Former Smoker     Packs/day: 1.50     Years: 42.00     Pack years: 63.00     Types: Cigarettes     Last attempt to quit:      Years since quittin.0    Smokeless tobacco: Never Used   Substance and Sexual Activity    Alcohol use: No    Drug use: No    Sexual activity: No   Other Topics Concern    Not on file   Social History Narrative    Not on file      Family History   Problem Relation Age of Onset    Alzheimer's disease Mother     Hypertension Mother     Cancer Father     Hypertension Sister     Kidney disease Sister     Heart disease Sister     Hypertension Brother     No Known Problems Daughter     Guillain-[place] syndrome Maternal Aunt     Cancer Paternal Grandmother     Cancer Paternal Grandfather     COPD Paternal Grandfather     Fibromyalgia Sister     Diabetes Mellitus Sister     No Known Problems Sister     No Known Problems Sister     Diabetes Mellitus Brother     Hypertension Brother     Hyperlipidemia Brother     Diabetes Mellitus Brother     Hypertension Brother     Osteoarthritis Brother      Review of patient's allergies indicates:   Allergen Reactions    Pcn [penicillins] Hives    Protonix [pantoprazole] Other (See Comments)     Bloating with stomach and chest pains    Hydroxychloroquine      Chest pain       Current Outpatient Medications:     albuterol 90 mcg/actuation inhaler, Inhale 2 puffs into the lungs every 6 (six) hours as needed for Wheezing. Rescue, Disp: , Rfl:     diclofenac (VOLTAREN) 50 MG EC tablet, , Disp: , Rfl:     ergocalciferol (VITAMIN D2) 50,000 unit Cap, Take 4,000 Units by mouth once daily., Disp: , Rfl:     fluticasone-salmeterol 100-50 mcg/dose (ADVAIR DISKUS) 100-50 mcg/dose diskus inhaler, Inhale 1 puff into the lungs 2 (two) times daily. Controller, Disp: , Rfl:     gabapentin (NEURONTIN) 300 MG capsule, Take 300 mg by mouth 2 (two) times daily., Disp: , Rfl:     hydrocortisone (ANUSOL-HC) 2.5 % rectal cream, Place 1 drop rectally 2 (two) times daily as needed., Disp: , Rfl:     LORazepam (ATIVAN) 0.5 MG tablet, Take one tablet 30minutes prior to exam, may take another just before exam if needed., Disp: 2 tablet, Rfl: 0    metFORMIN (GLUCOPHAGE) 500 MG tablet, Take 500 mg by mouth daily with breakfast., Disp: , Rfl:     NIFEdipine (ADALAT CC) 30 MG TbSR, Take 1 tablet (30 mg total)  by mouth once daily., Disp: 30 tablet, Rfl: 11    nortriptyline (PAMELOR) 25 MG capsule, Take 25 mg by mouth every evening., Disp: , Rfl:     omeprazole (PRILOSEC) 20 MG capsule, Take 20 mg by mouth once daily., Disp: , Rfl:     rosuvastatin (CRESTOR) 20 MG tablet, Take 20 mg by mouth once daily., Disp: , Rfl:     rosuvastatin (CRESTOR) 5 MG tablet, Take 1 tablet by mouth 2 (two) times daily as needed., Disp: , Rfl:   Blood pressure 107/68, pulse 93, height 5' (1.524 m), weight 61.1 kg (134 lb 13 oz).      Neurologic Exam     Mental Status   Oriented to person, place, and time.   Attention: normal. Concentration: normal.   Speech: speech is normal   Level of consciousness: alert  Knowledge: good.     Cranial Nerves     CN II   Visual acuity: normal    CN III, IV, VI   Pupils are equal, round, and reactive to light.  Extraocular motions are normal.     CN V   Facial sensation intact.     CN VII   Facial expression full, symmetric.     CN VIII   Hearing: intact    CN IX, X   Palate: symmetric    CN XI   CN XI normal.     CN XII   CN XII normal.     Motor Exam   Muscle bulk: normal  Overall muscle tone: normal  Right arm pronator drift: absent  Left arm pronator drift: absent    Strength   Right deltoid: 5/5  Left deltoid: 5/5  Right biceps: 5/5  Left biceps: 5/5  Right triceps: 5/5  Left triceps: 5/5  Right wrist flexion: 4/5  Left wrist flexion: 5/5  Right wrist extension: 5/5  Left wrist extension: 5/5  Right interossei: 5/5  Left interossei: 5/5  Right iliopsoas: 5/5  Left iliopsoas: 5/5  Right quadriceps: 5/5  Left quadriceps: 5/5  Right hamstrin/5  Left hamstrin/5  Right anterior tibial: 5/5  Left anterior tibial: 5/5  Right posterior tibial: 5/5  Left posterior tibial: 5/5  Right peroneal: 5/5  Left peroneal: 5/5  Right gastroc: 5/5  Left gastroc: 5/5    Sensory Exam   Left arm light touch: normal  Right leg light touch: normal  Left leg light touch: decreased from ankle  Right sensory deficit  distribution: C6, C7.    Gait, Coordination, and Reflexes     Gait  Gait: normal    Coordination   Romberg: negative  Finger to nose coordination: normal  Tandem walking coordination: normal    Tremor   Resting tremor: absent    Reflexes   Right brachioradialis: 1+  Left brachioradialis: 2+  Right biceps: 1+  Left biceps: 2+  Right triceps: 2+  Left triceps: 2+  Right patellar: 2+  Left patellar: 2+  Right achilles: 1+  Left achilles: 1+  Right plantar: normal  Left plantar: normal  Right Kline: absent  Left Kline: absent  Right ankle clonus: absent  Left ankle clonus: absent      Physical Exam   Constitutional: She is oriented to person, place, and time. She appears well-developed and well-nourished.   HENT:   Head: Normocephalic and atraumatic.   Eyes: EOM are normal. Pupils are equal, round, and reactive to light.   Neck: Normal range of motion. Neck supple.   Cardiovascular: Normal rate and regular rhythm.   Pulmonary/Chest: Effort normal.   Abdominal: Soft.   Musculoskeletal: Normal range of motion.   Neurological: She is alert and oriented to person, place, and time. She has a normal Finger-Nose-Finger Test, a normal Romberg Test and a normal Tandem Gait Test. Gait normal.   Reflex Scores:       Tricep reflexes are 2+ on the right side and 2+ on the left side.       Bicep reflexes are 1+ on the right side and 2+ on the left side.       Brachioradialis reflexes are 1+ on the right side and 2+ on the left side.       Patellar reflexes are 2+ on the right side and 2+ on the left side.       Achilles reflexes are 1+ on the right side and 1+ on the left side.  Skin: Skin is warm and dry.   Psychiatric: She has a normal mood and affect. Her speech is normal and behavior is normal. Judgment and thought content normal.   Nursing note and vitals reviewed.      Vital Signs  Pulse: 93  BP: 107/68  Pain Score:   1  Pain Loc: Neck  Height and Weight  Height: 5' (152.4 cm)  Weight: 61.1 kg (134 lb 13 oz)  BSA  (Calculated - sq m): 1.61 sq meters  BMI (Calculated): 26.4  Weight in (lb) to have BMI = 25: 127.7]    Provider dictation:  I reviewed the imaging. She has multilevel spondylotic cervical stenosis with no spinal cord compression, but foraminal stenosis R>L at C4-C5, C5-C6, C6-C7.     She has been experiencing neck pain, worse on the right, for about three years and it started radiating to her right upper arm and into her wrist occasionally and she has noted intermittent numbness in her finger tips, usually on the right but sometimes on the left as well for about one year. The numbness is usually worse in her thumb and middle finger but involves all fingers. She can not identify clear exacerbating or alleviating factors. She describes decreased strength in her right hand and now favors her left hand to  shopping bags, even though she is right-handed. She denies talha clumsiness but occasionally drops things from her right hand. She describes chronic balance issues where she feels that she sometimes walks as if she is drunk.    On exam, she has no myelopathy, normal tandem gait and no Romberg. She has trace right hand  and wrist flexion weakness. She has numbness on the right in a C6/C7 vs median nerve distribution although with no talha splitting.     I suspect that her symptoms are due to her right-sided foraminal stenosis at C4-C7 and resultant radiculopathy. At this point, there is no absolute surgical indication and she has not attempted conservative measures. I would like her to see Pain Management for cervical FRANKIE and for her to undergo a round of PT with mild cervical traction. F/u in 3 months with Gissell Rowland. If her symptoms are not improved at that time, she should have an EMG/NCT to better characterize the source of her arm symptoms.     Visit Diagnosis:  DDD (degenerative disc disease), cervical  -     Ambulatory Referral to Physical Therapy    Degeneration of cervical intervertebral  disc    Cervical radiculopathy

## 2019-01-22 ENCOUNTER — TELEPHONE (OUTPATIENT)
Dept: NEUROSURGERY | Facility: CLINIC | Age: 64
End: 2019-01-22

## 2019-01-22 DIAGNOSIS — M54.12 CERVICAL RADICULOPATHY: Primary | ICD-10-CM

## 2019-01-22 RX ORDER — SODIUM CHLORIDE, SODIUM LACTATE, POTASSIUM CHLORIDE, CALCIUM CHLORIDE 600; 310; 30; 20 MG/100ML; MG/100ML; MG/100ML; MG/100ML
INJECTION, SOLUTION INTRAVENOUS CONTINUOUS
Status: CANCELLED | OUTPATIENT
Start: 2019-02-19

## 2019-01-22 NOTE — TELEPHONE ENCOUNTER
----- Message from Steffi Stewart LPN sent at 1/22/2019  2:03 PM CST -----  Contact: pt  She is trying to reschedule cervical injection  ----- Message -----  From: Miquel Martinez  Sent: 1/22/2019  10:08 AM  To: [facility] Staff    Pt is requesting to reschedule their appointment.    Date of current appointment: 02/28/19  Reason for reschedule: another appt that day  Additional information: patient is requesting the end of march    Call back: [phone]  thanks

## 2019-03-07 ENCOUNTER — TELEPHONE (OUTPATIENT)
Dept: NEUROSURGERY | Facility: CLINIC | Age: 64
End: 2019-03-07

## 2019-03-07 NOTE — TELEPHONE ENCOUNTER
Pt called requesting to cancel her appt at this time. She will call back once school is out to reschedule.

## 2019-07-31 ENCOUNTER — TELEPHONE (OUTPATIENT)
Dept: RHEUMATOLOGY | Facility: CLINIC | Age: 64
End: 2019-07-31

## 2019-07-31 NOTE — TELEPHONE ENCOUNTER
----- Message from Cathy Jose Maria sent at 7/31/2019  2:08 PM CDT -----  Contact: Patient  Type: Needs Medical Advice    Who Called:  Patient  Symptoms (please be specific):  Pain in left hip and shoulder  Best Call Back Number:   Additional Information: Calling to schedule an appt within the next few days to get a shot in hip and shoulder. Please advise    Patient states that the hip and shoulder pain feels sharp like a toothache. Patient aware there are no openings for injections anytime soon.She is on the wait list for a sooner appointment and is booked 10-10-19.     Patient declines seeing Tor or PCP. Patient states a Medrol dose pack works well and she has not had one in a long time. Please advise. CG

## 2019-08-01 RX ORDER — METHYLPREDNISOLONE 4 MG/1
TABLET ORAL
Qty: 1 PACKAGE | Refills: 0 | Status: SHIPPED | OUTPATIENT
Start: 2019-08-01 | End: 2021-05-05

## 2019-11-08 NOTE — TELEPHONE ENCOUNTER
Can we see if her PCP has done any recent BMP or CMP on her and get that faxed over. Thanks!   DR. [facility]

## 2019-11-19 NOTE — TELEPHONE ENCOUNTER
Attempted to reach pt to verify refill request and if recent labs done with another provider. lvm to call office back.

## 2019-11-20 NOTE — TELEPHONE ENCOUNTER
----- Message from No Marsh sent at 11/20/2019  9:52 AM CST -----  Contact: patient   Type:  Patient Returning Call    Who Called: patient   Who Left Message for Patient:  Ignacia   Does the patient know what this is regarding?:  Refill   Best Call Back Number:  [phone] (home)

## 2019-11-25 RX ORDER — DICLOFENAC SODIUM 50 MG/1
50 TABLET, DELAYED RELEASE ORAL 2 TIMES DAILY
Qty: 60 TABLET | Refills: 4 | Status: SHIPPED | OUTPATIENT
Start: 2019-11-25 | End: 2020-02-13 | Stop reason: SDUPTHER

## 2020-02-13 ENCOUNTER — OFFICE VISIT (OUTPATIENT)
Dept: RHEUMATOLOGY | Facility: CLINIC | Age: 65
End: 2020-02-13
Payer: MEDICARE

## 2020-02-13 VITALS
WEIGHT: 135.25 LBS | BODY MASS INDEX: 26.55 KG/M2 | HEIGHT: 60 IN | SYSTOLIC BLOOD PRESSURE: 120 MMHG | HEART RATE: 81 BPM | DIASTOLIC BLOOD PRESSURE: 85 MMHG

## 2020-02-13 DIAGNOSIS — I73.00 RAYNAUD'S DISEASE WITHOUT GANGRENE: ICD-10-CM

## 2020-02-13 DIAGNOSIS — R76.8 ANA POSITIVE: Primary | ICD-10-CM

## 2020-02-13 PROCEDURE — 99999 PR PBB SHADOW E&M-EST. PATIENT-LVL III: CPT | Mod: PBBFAC,,, | Performed by: INTERNAL MEDICINE

## 2020-02-13 PROCEDURE — 3008F BODY MASS INDEX DOCD: CPT | Mod: CPTII,S$GLB,, | Performed by: INTERNAL MEDICINE

## 2020-02-13 PROCEDURE — 99999 PR PBB SHADOW E&M-EST. PATIENT-LVL III: ICD-10-PCS | Mod: PBBFAC,,, | Performed by: INTERNAL MEDICINE

## 2020-02-13 PROCEDURE — 3008F PR BODY MASS INDEX (BMI) DOCUMENTED: ICD-10-PCS | Mod: CPTII,S$GLB,, | Performed by: INTERNAL MEDICINE

## 2020-02-13 PROCEDURE — 99214 OFFICE O/P EST MOD 30 MIN: CPT | Mod: S$GLB,,, | Performed by: INTERNAL MEDICINE

## 2020-02-13 PROCEDURE — 99214 PR OFFICE/OUTPT VISIT, EST, LEVL IV, 30-39 MIN: ICD-10-PCS | Mod: S$GLB,,, | Performed by: INTERNAL MEDICINE

## 2020-02-13 RX ORDER — DICLOFENAC SODIUM 50 MG/1
50 TABLET, DELAYED RELEASE ORAL 2 TIMES DAILY
Qty: 60 TABLET | Refills: 4 | Status: SHIPPED | OUTPATIENT
Start: 2020-02-13 | End: 2020-12-21

## 2020-02-13 RX ORDER — NIFEDIPINE 30 MG/1
30 TABLET, FILM COATED, EXTENDED RELEASE ORAL DAILY
Qty: 30 TABLET | Refills: 11 | Status: SHIPPED | OUTPATIENT
Start: 2020-02-13 | End: 2021-03-08

## 2020-02-13 ASSESSMENT — ROUTINE ASSESSMENT OF PATIENT INDEX DATA (RAPID3)
TOTAL RAPID3 SCORE: 1.17
PATIENT GLOBAL ASSESSMENT SCORE: .5
PSYCHOLOGICAL DISTRESS SCORE: 1.1
PAIN SCORE: 2
MDHAQ FUNCTION SCORE: .3

## 2020-02-13 NOTE — PROGRESS NOTES
Subjective:          Chief Complaint: Minal Hay is a 64 y.o. female who had concerns including Follow-up.    HPI:  Interval events:   Last seen 2018 for Raynauds with weekly + DARYL. Does have erythema hands with some episodes with gray on hands for 24 hours but no purple/black and resolved.   PFT 11/2019 at [place] changed to [place]  No recurrent dysphagia.   New rahs forearms and legs : no detergent, changes soaps or food. Hx of eczema.     RHeum Hx:     She is a 62-year-old female she's here for history of possible RA with waxing serologies, ++Raynauds triphasic hands and feet. She has seen [facility] rheumatology in the past told she may have RA  Pain in fingers, right elbow, bursitis in hips and shoulders intermittently.   Recent +DARYL 7/2017: 1:80 centromere pattern, RF negative, hepatitis panel.   She has stiffness in joints worse with use. Intermittent swelling throughout the day. She notes some stiffness in the morning.   No Ps, no famhx ps, no IBD. DDD in the cervical spine using gabapentin for this. No FRANKIE/RFA but has not seen in about 2 years. .   No anemia, with exception of menstruation. No hx seizure, no stroke. She does have COPD no ILD. Pleurisy only with pneumonia. She has residual scar on right lung. No sclerodactyly  She denies sclerodactyly, no dysphagia. + peripheral neuropathy attributed to the Raynauds-never NCS/EMG-describes pin/needles in feet constantly. Feel like walking on rough concrete when walking.   She uses Mobic 15mg daily some help.     Breast cancer x 10 years. Lumpectomy and radiation. UTD mammogram.   Component      Latest Ref Rng & Units 10/31/2017 10/31/2017          12:21 PM 12:21 PM   Anti-SSA Antibody      0.00 - 19.99 EU  2.46   Anti-SSA Interpretation      Negative  Negative   Anti Sm/RNP Antibody      0.00 - 19.99 EU  1.63   Anti-Sm/RNP Interpretation      Negative  Negative   Anti Sm Antibody      0.00 - 19.99 EU  2.01   Anti-Sm Interpretation      Negative   Negative   Anti-SSB Antibody      0.00 - 19.99 EU  0.28   Anti-SSB Interpretation      Negative  Negative   Rheumatoid Factor      0.0 - 15.0 IU/mL <10.0 <10.0   Sed Rate      0 - 20 mm/Hr  18   ds DNA Ab      Negative 1:10  Negative 1:10   Complement (C-3)      50 - 180 mg/dL  139   Complement (C-4)      11 - 44 mg/dL  25   Complement,Total, Serum      42 - 95 U/mL  82   CCP Antibodies      <5.0 U/mL  <0.5       REVIEW OF SYSTEMS:    Review of Systems   Constitutional: Negative for fever, malaise/fatigue and weight loss.   HENT: Negative for sore throat.    Eyes: Negative for double vision, photophobia and redness.   Respiratory: Negative for cough, shortness of breath and wheezing.    Cardiovascular: Negative for chest pain, palpitations and orthopnea.   Gastrointestinal: Negative for abdominal pain, constipation and diarrhea.   Genitourinary: Negative for dysuria, hematuria and urgency.   Musculoskeletal: Positive for joint pain. Negative for back pain and myalgias.   Skin: Negative for rash.   Neurological: Negative for dizziness, tingling, focal weakness and headaches.   Endo/Heme/Allergies: Does not bruise/bleed easily.   Psychiatric/Behavioral: Negative for depression, hallucinations and suicidal ideas.               Objective:            Past Medical History:   Diagnosis Date    Arthritis     Cancer     COPD (chronic obstructive pulmonary disease)     Dry eyes     GERD (gastroesophageal reflux disease)     Hyperlipidemia      Family History   Problem Relation Age of Onset    Alzheimer's disease Mother     Hypertension Mother     Cancer Father     Hypertension Sister     Kidney disease Sister     Heart disease Sister     Hypertension Brother     No Known Problems Daughter     Guillain-[place] syndrome Maternal Aunt     Cancer Paternal Grandmother     Cancer Paternal Grandfather     COPD Paternal Grandfather     Fibromyalgia Sister     Diabetes Mellitus Sister     No Known Problems Sister      No Known Problems Sister     Diabetes Mellitus Brother     Hypertension Brother     Hyperlipidemia Brother     Diabetes Mellitus Brother     Hypertension Brother     Osteoarthritis Brother      Social History     Tobacco Use    Smoking status: Former Smoker     Packs/day: 1.50     Years: 42.00     Pack years: 63.00     Types: Cigarettes     Last attempt to quit: 2013     Years since quittin.1    Smokeless tobacco: Never Used   Substance Use Topics    Alcohol use: No    Drug use: No         Current Outpatient Medications on File Prior to Visit   Medication Sig Dispense Refill    albuterol 90 mcg/actuation inhaler Inhale 2 puffs into the lungs every 6 (six) hours as needed for Wheezing. Rescue      diclofenac (VOLTAREN) 50 MG EC tablet Take 1 tablet (50 mg total) by mouth 2 (two) times daily. 60 tablet 4    ergocalciferol (VITAMIN D2) 50,000 unit Cap Take 5,000 Units by mouth once daily.       fluticasone-salmeterol 100-50 mcg/dose (ADVAIR DISKUS) 100-50 mcg/dose diskus inhaler Inhale 1 puff into the lungs 2 (two) times daily. Controller      gabapentin (NEURONTIN) 300 MG capsule Take 300 mg by mouth 2 (two) times daily.      hydrocortisone (ANUSOL-HC) 2.5 % rectal cream Place 1 drop rectally 2 (two) times daily as needed.      metFORMIN (GLUCOPHAGE) 500 MG tablet Take 500 mg by mouth daily with breakfast.      NIFEdipine (ADALAT CC) 30 MG TbSR Take 1 tablet (30 mg total) by mouth once daily. 30 tablet 11    nortriptyline (PAMELOR) 25 MG capsule Take 25 mg by mouth every evening.      omeprazole (PRILOSEC) 20 MG capsule Take 20 mg by mouth once daily.      rosuvastatin (CRESTOR) 20 MG tablet Take 20 mg by mouth once daily.      LORazepam (ATIVAN) 0.5 MG tablet Take one tablet 30minutes prior to exam, may take another just before exam if needed. (Patient not taking: Reported on 2020) 2 tablet 0    methylPREDNISolone (MEDROL DOSEPACK) 4 mg tablet use as directed (Patient not taking:  Reported on 2/13/2020) 1 Package 0    rosuvastatin (CRESTOR) 5 MG tablet Take 1 tablet by mouth 2 (two) times daily as needed.       No current facility-administered medications on file prior to visit.        Vitals:    02/13/20 1102   BP: 120/85   Pulse: 81       Physical Exam:    Physical Exam   Constitutional: She appears well-developed and well-nourished.   HENT:   Nose: No septal deviation.   Mouth/Throat: Mucous membranes are normal. No oral lesions.   Eyes: Pupils are equal, round, and reactive to light. Right conjunctiva is not injected. Left conjunctiva is not injected.   Neck: No JVD present. No thyroid mass and no thyromegaly present.   Cardiovascular: Normal rate, regular rhythm and normal pulses.   No edema   Pulmonary/Chest: Effort normal and breath sounds normal.   Abdominal: Soft. Normal appearance. There is no hepatosplenomegaly.   Musculoskeletal:        Right shoulder: She exhibits tenderness. She exhibits normal range of motion and no swelling.        Left shoulder: She exhibits tenderness. She exhibits normal range of motion and no swelling.        Right elbow: She exhibits normal range of motion and no swelling. No tenderness found.        Left elbow: She exhibits normal range of motion and no swelling. No tenderness found.        Right wrist: She exhibits normal range of motion, no tenderness and no swelling.        Left wrist: She exhibits normal range of motion, no tenderness and no swelling.        Right hip: She exhibits bony tenderness. She exhibits normal range of motion, normal strength and no swelling.        Left hip: She exhibits bony tenderness. She exhibits normal range of motion, no tenderness and no swelling.        Right knee: She exhibits normal range of motion and no swelling. No tenderness found.        Left knee: She exhibits normal range of motion and no swelling. No tenderness found.        Right ankle: She exhibits normal range of motion and no swelling. No tenderness.         Left ankle: She exhibits normal range of motion and no swelling. No tenderness.        Right hand: She exhibits tenderness.        Left hand: She exhibits tenderness.   5/5 upper and lower extremity bilateral muscle strength   Lymphadenopathy:     She has no cervical adenopathy.     She has no axillary adenopathy.   Neurological: She has normal strength and normal reflexes.   Skin: Skin is dry and intact.   +Raynauds   Psychiatric: She has a normal mood and affect.             Assessment:       Encounter Diagnoses   Name Primary?    Raynaud's disease without gangrene     DARYL positive Yes          Plan:        DARYL positive  -     NIFEdipine (ADALAT CC) 30 MG TbSR; Take 1 tablet (30 mg total) by mouth once daily.  Dispense: 30 tablet; Refill: 11  -     diclofenac (VOLTAREN) 50 MG EC tablet; Take 1 tablet (50 mg total) by mouth 2 (two) times daily.  Dispense: 60 tablet; Refill: 4    Raynaud's disease without gangrene  -     NIFEdipine (ADALAT CC) 30 MG TbSR; Take 1 tablet (30 mg total) by mouth once daily.  Dispense: 30 tablet; Refill: 11  -     diclofenac (VOLTAREN) 50 MG EC tablet; Take 1 tablet (50 mg total) by mouth 2 (two) times daily.  Dispense: 60 tablet; Refill: 4      Pt with +DARYL and raynauds : arthritis more OA in pattern No indication of major organ dysfunction. The centromere pattern would make one think CREST but no features other than Raynaud's   -continue nifedipine      Follow up in about 4 months (around 6/13/2020).        30min consultation with greater than 50% spent in counseling, chart review and coordination of care. All questions answered.

## 2020-08-13 ENCOUNTER — TELEPHONE (OUTPATIENT)
Dept: RHEUMATOLOGY | Facility: CLINIC | Age: 65
End: 2020-08-13

## 2021-05-05 ENCOUNTER — OFFICE VISIT (OUTPATIENT)
Dept: RHEUMATOLOGY | Facility: CLINIC | Age: 66
End: 2021-05-05
Payer: MEDICARE

## 2021-05-05 VITALS
BODY MASS INDEX: 27.23 KG/M2 | DIASTOLIC BLOOD PRESSURE: 70 MMHG | HEIGHT: 60 IN | SYSTOLIC BLOOD PRESSURE: 131 MMHG | WEIGHT: 138.69 LBS | HEART RATE: 87 BPM

## 2021-05-05 DIAGNOSIS — R76.8 ANA POSITIVE: Primary | ICD-10-CM

## 2021-05-05 DIAGNOSIS — I73.00 RAYNAUD'S DISEASE WITHOUT GANGRENE: ICD-10-CM

## 2021-05-05 DIAGNOSIS — J84.9 ILD (INTERSTITIAL LUNG DISEASE): ICD-10-CM

## 2021-05-05 PROCEDURE — 99214 PR OFFICE/OUTPT VISIT, EST, LEVL IV, 30-39 MIN: ICD-10-PCS | Mod: S$GLB,,, | Performed by: INTERNAL MEDICINE

## 2021-05-05 PROCEDURE — 3008F BODY MASS INDEX DOCD: CPT | Mod: CPTII,S$GLB,, | Performed by: INTERNAL MEDICINE

## 2021-05-05 PROCEDURE — 1126F AMNT PAIN NOTED NONE PRSNT: CPT | Mod: S$GLB,,, | Performed by: INTERNAL MEDICINE

## 2021-05-05 PROCEDURE — 99214 OFFICE O/P EST MOD 30 MIN: CPT | Mod: S$GLB,,, | Performed by: INTERNAL MEDICINE

## 2021-05-05 PROCEDURE — 99999 PR PBB SHADOW E&M-EST. PATIENT-LVL III: ICD-10-PCS | Mod: PBBFAC,,, | Performed by: INTERNAL MEDICINE

## 2021-05-05 PROCEDURE — 99999 PR PBB SHADOW E&M-EST. PATIENT-LVL III: CPT | Mod: PBBFAC,,, | Performed by: INTERNAL MEDICINE

## 2021-05-05 PROCEDURE — 3008F PR BODY MASS INDEX (BMI) DOCUMENTED: ICD-10-PCS | Mod: CPTII,S$GLB,, | Performed by: INTERNAL MEDICINE

## 2021-05-05 PROCEDURE — 1126F PR PAIN SEVERITY QUANTIFIED, NO PAIN PRESENT: ICD-10-PCS | Mod: S$GLB,,, | Performed by: INTERNAL MEDICINE

## 2021-05-10 ASSESSMENT — ROUTINE ASSESSMENT OF PATIENT INDEX DATA (RAPID3)
PATIENT GLOBAL ASSESSMENT SCORE: 2
PSYCHOLOGICAL DISTRESS SCORE: 3.3
TOTAL RAPID3 SCORE: 3.33
PAIN SCORE: 6
MDHAQ FUNCTION SCORE: 0.6

## 2021-11-08 ENCOUNTER — OFFICE VISIT (OUTPATIENT)
Dept: RHEUMATOLOGY | Facility: CLINIC | Age: 66
End: 2021-11-08
Payer: MEDICARE

## 2021-11-08 VITALS
SYSTOLIC BLOOD PRESSURE: 103 MMHG | BODY MASS INDEX: 27.12 KG/M2 | HEIGHT: 60 IN | DIASTOLIC BLOOD PRESSURE: 69 MMHG | HEART RATE: 99 BPM | WEIGHT: 138.13 LBS

## 2021-11-08 DIAGNOSIS — M15.9 PRIMARY OSTEOARTHRITIS INVOLVING MULTIPLE JOINTS: ICD-10-CM

## 2021-11-08 DIAGNOSIS — M48.01 SPINAL STENOSIS OF OCCIPITO-ATLANTO-AXIAL REGION: ICD-10-CM

## 2021-11-08 DIAGNOSIS — R76.8 ANA POSITIVE: Primary | ICD-10-CM

## 2021-11-08 DIAGNOSIS — I73.00 RAYNAUD'S PHENOMENON WITHOUT GANGRENE: ICD-10-CM

## 2021-11-08 PROCEDURE — 3008F BODY MASS INDEX DOCD: CPT | Mod: CPTII,S$GLB,, | Performed by: INTERNAL MEDICINE

## 2021-11-08 PROCEDURE — 3074F PR MOST RECENT SYSTOLIC BLOOD PRESSURE < 130 MM HG: ICD-10-PCS | Mod: CPTII,S$GLB,, | Performed by: INTERNAL MEDICINE

## 2021-11-08 PROCEDURE — 1101F PT FALLS ASSESS-DOCD LE1/YR: CPT | Mod: CPTII,S$GLB,, | Performed by: INTERNAL MEDICINE

## 2021-11-08 PROCEDURE — 3008F PR BODY MASS INDEX (BMI) DOCUMENTED: ICD-10-PCS | Mod: CPTII,S$GLB,, | Performed by: INTERNAL MEDICINE

## 2021-11-08 PROCEDURE — 1101F PR PT FALLS ASSESS DOC 0-1 FALLS W/OUT INJ PAST YR: ICD-10-PCS | Mod: CPTII,S$GLB,, | Performed by: INTERNAL MEDICINE

## 2021-11-08 PROCEDURE — 3288F FALL RISK ASSESSMENT DOCD: CPT | Mod: CPTII,S$GLB,, | Performed by: INTERNAL MEDICINE

## 2021-11-08 PROCEDURE — 99214 PR OFFICE/OUTPT VISIT, EST, LEVL IV, 30-39 MIN: ICD-10-PCS | Mod: S$GLB,,, | Performed by: INTERNAL MEDICINE

## 2021-11-08 PROCEDURE — 3078F PR MOST RECENT DIASTOLIC BLOOD PRESSURE < 80 MM HG: ICD-10-PCS | Mod: CPTII,S$GLB,, | Performed by: INTERNAL MEDICINE

## 2021-11-08 PROCEDURE — 1159F MED LIST DOCD IN RCRD: CPT | Mod: CPTII,S$GLB,, | Performed by: INTERNAL MEDICINE

## 2021-11-08 PROCEDURE — 3288F PR FALLS RISK ASSESSMENT DOCUMENTED: ICD-10-PCS | Mod: CPTII,S$GLB,, | Performed by: INTERNAL MEDICINE

## 2021-11-08 PROCEDURE — 99999 PR PBB SHADOW E&M-EST. PATIENT-LVL III: ICD-10-PCS | Mod: PBBFAC,,, | Performed by: INTERNAL MEDICINE

## 2021-11-08 PROCEDURE — 99214 OFFICE O/P EST MOD 30 MIN: CPT | Mod: S$GLB,,, | Performed by: INTERNAL MEDICINE

## 2021-11-08 PROCEDURE — 1160F RVW MEDS BY RX/DR IN RCRD: CPT | Mod: CPTII,S$GLB,, | Performed by: INTERNAL MEDICINE

## 2021-11-08 PROCEDURE — 99999 PR PBB SHADOW E&M-EST. PATIENT-LVL III: CPT | Mod: PBBFAC,,, | Performed by: INTERNAL MEDICINE

## 2021-11-08 PROCEDURE — 1160F PR REVIEW ALL MEDS BY PRESCRIBER/CLIN PHARMACIST DOCUMENTED: ICD-10-PCS | Mod: CPTII,S$GLB,, | Performed by: INTERNAL MEDICINE

## 2021-11-08 PROCEDURE — 3078F DIAST BP <80 MM HG: CPT | Mod: CPTII,S$GLB,, | Performed by: INTERNAL MEDICINE

## 2021-11-08 PROCEDURE — 1159F PR MEDICATION LIST DOCUMENTED IN MEDICAL RECORD: ICD-10-PCS | Mod: CPTII,S$GLB,, | Performed by: INTERNAL MEDICINE

## 2021-11-08 PROCEDURE — 3074F SYST BP LT 130 MM HG: CPT | Mod: CPTII,S$GLB,, | Performed by: INTERNAL MEDICINE

## 2021-11-08 RX ORDER — ROSUVASTATIN CALCIUM 5 MG/1
5 TABLET, COATED ORAL DAILY
COMMUNITY
Start: 2021-10-28

## 2022-03-10 ENCOUNTER — PATIENT MESSAGE (OUTPATIENT)
Dept: RHEUMATOLOGY | Facility: CLINIC | Age: 67
End: 2022-03-10
Payer: MEDICARE

## 2022-05-10 ENCOUNTER — PATIENT MESSAGE (OUTPATIENT)
Dept: RHEUMATOLOGY | Facility: CLINIC | Age: 67
End: 2022-05-10
Payer: MEDICARE

## 2022-07-05 ENCOUNTER — TELEPHONE (OUTPATIENT)
Dept: RHEUMATOLOGY | Facility: CLINIC | Age: 67
End: 2022-07-05
Payer: MEDICARE

## 2022-07-05 NOTE — TELEPHONE ENCOUNTER
----- Message from [place] sent at 7/5/2022  9:59 AM CDT -----  Type:  Needs Medical Advice    Who Called: PT   Would the patient rather a call back or a response via [facility]? Callback   Best Call Back Number: [phone]  Additional Information: Pt requesting callback from office to discuss rescheduling cancelled appt sooner than first available in Epic (01/2023)     Patient asking to be rescheduled due to pneumonia. Booked for August now. CG

## 2024-09-29 NOTE — TELEPHONE ENCOUNTER
Please call pt to schedule cervical FRANKIE with Dr. Lentz or Dr. Holbrook. Whomever has the first opening. Thanks in advance.   
Spoke with the patient and she requested to be scheduled the week of Feb 18. We have her scheduled for 2/19 for a RAFAEL with Dr. Lentz.  
Yes, please schedule with either of us  
Warm